# Patient Record
Sex: FEMALE | URBAN - METROPOLITAN AREA
[De-identification: names, ages, dates, MRNs, and addresses within clinical notes are randomized per-mention and may not be internally consistent; named-entity substitution may affect disease eponyms.]

---
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
## 2024-02-15 ENCOUNTER — OFFICE VISIT (OUTPATIENT)
Age: 19
End: 2024-02-15

## 2024-02-15 VITALS
SYSTOLIC BLOOD PRESSURE: 112 MMHG | WEIGHT: 128 LBS | HEART RATE: 100 BPM | TEMPERATURE: 98.5 F | DIASTOLIC BLOOD PRESSURE: 75 MMHG | BODY MASS INDEX: 22.68 KG/M2 | RESPIRATION RATE: 18 BRPM | OXYGEN SATURATION: 96 % | HEIGHT: 63 IN

## 2024-02-15 DIAGNOSIS — J10.1 INFLUENZA A: ICD-10-CM

## 2024-02-15 DIAGNOSIS — R05.1 ACUTE COUGH: Primary | ICD-10-CM

## 2024-02-15 LAB
INFLUENZA VIRUS A RNA: ABNORMAL
INFLUENZA VIRUS B RNA: ABNORMAL

## 2024-02-15 RX ORDER — OSELTAMIVIR PHOSPHATE 75 MG/1
75 CAPSULE ORAL 2 TIMES DAILY
Qty: 10 CAPSULE | Refills: 0 | Status: SHIPPED | OUTPATIENT
Start: 2024-02-15 | End: 2024-02-20

## 2024-02-15 NOTE — PROGRESS NOTES
Angela Cuello (:  2005) is a 18 y.o. female,Established patient, here for evaluation of the following chief complaint(s):  Congestion (Pt was exposed to Flu, c/o congestion, headache and body aches )      ASSESSMENT/PLAN:  Visit Diagnoses and Associated Orders       Acute cough    -  Primary    POCT Influenza A/B DNA (Alere i) [Flu A+]           Influenza A             ORDERS WITHOUT AN ASSOCIATED DIAGNOSIS    oseltamivir (TAMIFLU) 75 MG capsule [77126]          Increase fluids (preferably with electrolytes) and rest.  Emergency follow up required for symptoms including, but not limited to, shortness of breath, chest pain, mental status change, fevers >101, difficulty or inability to swallow, dehydration, or if symptoms worsen.  See printed instructions given at discharge.     SUBJECTIVE/OBJECTIVE:  Pt was exposed to Flu, c/o nasal congestion, frontal sinus headache and body aches         History provided by:  Patient    HPI:   18 y.o. female presents with symptoms of Sinus Pain  Patient complains of congestion, cough, headaches, nasal congestion, and sore throat.          Vitals:    02/15/24 1514   BP: 112/75   Pulse: 100   Resp: 18   Temp: 98.5 °F (36.9 °C)   SpO2: 96%   Weight: 58.1 kg (128 lb)   Height: 1.6 m (5' 3\")         Physical Exam  Constitutional:       General: She is not in acute distress.     Appearance: Normal appearance. She is well-developed.   HENT:      Right Ear: External ear normal. Tenderness present. Tympanic membrane is bulging. Tympanic membrane is not erythematous.      Left Ear: External ear normal. Tenderness present. Tympanic membrane is bulging. Tympanic membrane is not erythematous.      Nose: Congestion and rhinorrhea present.      Right Turbinates: Swollen.      Left Turbinates: Swollen.      Right Sinus: Frontal sinus tenderness present. No maxillary sinus tenderness.      Left Sinus: Frontal sinus tenderness present. No maxillary sinus tenderness.      Mouth/Throat: